# Patient Record
Sex: FEMALE | Race: BLACK OR AFRICAN AMERICAN | NOT HISPANIC OR LATINO | Employment: FULL TIME | URBAN - METROPOLITAN AREA
[De-identification: names, ages, dates, MRNs, and addresses within clinical notes are randomized per-mention and may not be internally consistent; named-entity substitution may affect disease eponyms.]

---

## 2017-07-27 ENCOUNTER — ALLSCRIPTS OFFICE VISIT (OUTPATIENT)
Dept: OTHER | Facility: OTHER | Age: 37
End: 2017-07-27

## 2022-05-27 ENCOUNTER — APPOINTMENT (EMERGENCY)
Dept: CT IMAGING | Facility: HOSPITAL | Age: 42
End: 2022-05-27
Payer: COMMERCIAL

## 2022-05-27 ENCOUNTER — HOSPITAL ENCOUNTER (EMERGENCY)
Facility: HOSPITAL | Age: 42
Discharge: HOME | End: 2022-05-27
Attending: EMERGENCY MEDICINE
Payer: COMMERCIAL

## 2022-05-27 VITALS
HEIGHT: 70 IN | TEMPERATURE: 97.6 F | BODY MASS INDEX: 27.06 KG/M2 | SYSTOLIC BLOOD PRESSURE: 128 MMHG | HEART RATE: 78 BPM | DIASTOLIC BLOOD PRESSURE: 72 MMHG | RESPIRATION RATE: 16 BRPM | WEIGHT: 189 LBS | OXYGEN SATURATION: 99 %

## 2022-05-27 DIAGNOSIS — U07.1 COVID: Primary | ICD-10-CM

## 2022-05-27 LAB
ALBUMIN SERPL BCP-MCNC: 3.6 G/DL (ref 3.5–5)
ALP SERPL-CCNC: 75 U/L (ref 46–116)
ALT SERPL W P-5'-P-CCNC: 20 U/L (ref 12–78)
ANION GAP SERPL CALCULATED.3IONS-SCNC: 7 MMOL/L (ref 4–13)
AST SERPL W P-5'-P-CCNC: 15 U/L (ref 5–45)
BASOPHILS # BLD MANUAL: 0 THOUSAND/UL (ref 0–0.1)
BASOPHILS NFR MAR MANUAL: 0 % (ref 0–1)
BILIRUB SERPL-MCNC: 0.2 MG/DL (ref 0.2–1)
BILIRUB UR QL STRIP: NEGATIVE
BUN SERPL-MCNC: 15 MG/DL (ref 5–25)
CALCIUM SERPL-MCNC: 8.6 MG/DL (ref 8.3–10.1)
CHLORIDE SERPL-SCNC: 104 MMOL/L (ref 100–108)
CLARITY UR: CLEAR
CO2 SERPL-SCNC: 27 MMOL/L (ref 21–32)
COLOR UR: YELLOW
CREAT SERPL-MCNC: 0.84 MG/DL (ref 0.6–1.3)
EOSINOPHIL # BLD MANUAL: 0.13 THOUSAND/UL (ref 0–0.4)
EOSINOPHIL NFR BLD MANUAL: 3 % (ref 0–6)
ERYTHROCYTE [DISTWIDTH] IN BLOOD BY AUTOMATED COUNT: 12 % (ref 11.6–15.1)
EXT PREG TEST URINE: NEGATIVE
EXT. CONTROL ED NAV: NORMAL
FLUAV RNA RESP QL NAA+PROBE: NEGATIVE
FLUBV RNA RESP QL NAA+PROBE: NEGATIVE
GFR SERPL CREATININE-BSD FRML MDRD: 86 ML/MIN/1.73SQ M
GLUCOSE SERPL-MCNC: 95 MG/DL (ref 65–140)
GLUCOSE UR STRIP-MCNC: NEGATIVE MG/DL
HCT VFR BLD AUTO: 38.8 % (ref 34.8–46.1)
HGB BLD-MCNC: 12.6 G/DL (ref 11.5–15.4)
HGB UR QL STRIP.AUTO: NEGATIVE
KETONES UR STRIP-MCNC: NEGATIVE MG/DL
LEUKOCYTE ESTERASE UR QL STRIP: NEGATIVE
LYMPHOCYTES # BLD AUTO: 2.55 THOUSAND/UL (ref 0.6–4.47)
LYMPHOCYTES # BLD AUTO: 58 % (ref 14–44)
MCH RBC QN AUTO: 31.1 PG (ref 26.8–34.3)
MCHC RBC AUTO-ENTMCNC: 32.5 G/DL (ref 31.4–37.4)
MCV RBC AUTO: 96 FL (ref 82–98)
MONOCYTES # BLD AUTO: 0.31 THOUSAND/UL (ref 0–1.22)
MONOCYTES NFR BLD: 7 % (ref 4–12)
NEUTROPHILS # BLD MANUAL: 1.23 THOUSAND/UL (ref 1.85–7.62)
NEUTS SEG NFR BLD AUTO: 28 % (ref 43–75)
NITRITE UR QL STRIP: NEGATIVE
PH UR STRIP.AUTO: 6 [PH]
PLATELET # BLD AUTO: 264 THOUSANDS/UL (ref 149–390)
PLATELET BLD QL SMEAR: ADEQUATE
PMV BLD AUTO: 11.1 FL (ref 8.9–12.7)
POTASSIUM SERPL-SCNC: 4 MMOL/L (ref 3.5–5.3)
PROT SERPL-MCNC: 7.4 G/DL (ref 6.4–8.2)
PROT UR STRIP-MCNC: NEGATIVE MG/DL
RBC # BLD AUTO: 4.05 MILLION/UL (ref 3.81–5.12)
RSV RNA RESP QL NAA+PROBE: NEGATIVE
SARS-COV-2 RNA RESP QL NAA+PROBE: POSITIVE
SODIUM SERPL-SCNC: 138 MMOL/L (ref 136–145)
SP GR UR STRIP.AUTO: 1.02 (ref 1–1.03)
UROBILINOGEN UR QL STRIP.AUTO: 0.2 E.U./DL
VARIANT LYMPHS # BLD AUTO: 4 %
WBC # BLD AUTO: 4.4 THOUSAND/UL (ref 4.31–10.16)

## 2022-05-27 PROCEDURE — 81025 URINE PREGNANCY TEST: CPT | Performed by: EMERGENCY MEDICINE

## 2022-05-27 PROCEDURE — 36415 COLL VENOUS BLD VENIPUNCTURE: CPT | Performed by: EMERGENCY MEDICINE

## 2022-05-27 PROCEDURE — 96361 HYDRATE IV INFUSION ADD-ON: CPT

## 2022-05-27 PROCEDURE — 85007 BL SMEAR W/DIFF WBC COUNT: CPT | Performed by: EMERGENCY MEDICINE

## 2022-05-27 PROCEDURE — 96374 THER/PROPH/DIAG INJ IV PUSH: CPT

## 2022-05-27 PROCEDURE — 99284 EMERGENCY DEPT VISIT MOD MDM: CPT | Performed by: EMERGENCY MEDICINE

## 2022-05-27 PROCEDURE — 99284 EMERGENCY DEPT VISIT MOD MDM: CPT

## 2022-05-27 PROCEDURE — 80053 COMPREHEN METABOLIC PANEL: CPT | Performed by: EMERGENCY MEDICINE

## 2022-05-27 PROCEDURE — 0241U HB NFCT DS VIR RESP RNA 4 TRGT: CPT | Performed by: EMERGENCY MEDICINE

## 2022-05-27 PROCEDURE — 81003 URINALYSIS AUTO W/O SCOPE: CPT | Performed by: EMERGENCY MEDICINE

## 2022-05-27 PROCEDURE — 74176 CT ABD & PELVIS W/O CONTRAST: CPT

## 2022-05-27 PROCEDURE — 85027 COMPLETE CBC AUTOMATED: CPT | Performed by: EMERGENCY MEDICINE

## 2022-05-27 RX ORDER — KETOROLAC TROMETHAMINE 30 MG/ML
15 INJECTION, SOLUTION INTRAMUSCULAR; INTRAVENOUS ONCE
Status: COMPLETED | OUTPATIENT
Start: 2022-05-27 | End: 2022-05-27

## 2022-05-27 RX ADMIN — KETOROLAC TROMETHAMINE 15 MG: 30 INJECTION, SOLUTION INTRAMUSCULAR at 14:18

## 2022-05-27 RX ADMIN — SODIUM CHLORIDE 1000 ML: 0.9 INJECTION, SOLUTION INTRAVENOUS at 14:09

## 2022-05-27 NOTE — Clinical Note
Lorenzo Floyd was seen and treated in our emergency department on 5/27/2022  Diagnosis: vane Graves  may return to work on return date  She may return on this date: 06/02/2022         If you have any questions or concerns, please don't hesitate to call        Ravi Gill MD    ______________________________           _______________          _______________  Hospital Representative                              Date                                Time

## 2022-05-27 NOTE — ED PROVIDER NOTES
History  Chief Complaint   Patient presents with    Flank Pain     Pt reports b/l flank and groin pain x 3 days  HPI  40 yo F presents with bilateral flank pain radiating to abdomen for the past three days  Also with congestion and bodyaches  Denies fevers or chills  Nothing makes better or worse  Pain is moderate, aching and constant  Denies trauma  Denies history of kidney stones  Reports medical history of migraines, surgical history of c section  None       History reviewed  No pertinent past medical history  History reviewed  No pertinent surgical history  History reviewed  No pertinent family history  I have reviewed and agree with the history as documented  E-Cigarette/Vaping    E-Cigarette Use Never User      E-Cigarette/Vaping Substances     Social History     Tobacco Use    Smoking status: Never Smoker    Smokeless tobacco: Never Used   Vaping Use    Vaping Use: Never used   Substance Use Topics    Alcohol use: Never    Drug use: Never       Review of Systems   Constitutional: Negative for chills and fever  HENT: Positive for congestion  Negative for dental problem and ear pain  Eyes: Negative for pain and redness  Respiratory: Negative for cough and shortness of breath  Cardiovascular: Negative for chest pain and palpitations  Gastrointestinal: Positive for abdominal pain  Negative for nausea  Endocrine: Negative for polydipsia and polyphagia  Genitourinary: Positive for flank pain  Negative for dysuria and frequency  Musculoskeletal: Positive for myalgias  Negative for arthralgias and joint swelling  Skin: Negative for color change and rash  Neurological: Negative for dizziness and headaches  Psychiatric/Behavioral: Negative for behavioral problems and confusion  All other systems reviewed and are negative  Physical Exam  Physical Exam  Vitals and nursing note reviewed  Constitutional:       General: She is not in acute distress       Appearance: She is well-developed  She is not diaphoretic  HENT:      Head: Atraumatic  Right Ear: External ear normal       Left Ear: External ear normal       Nose: Nose normal    Eyes:      Conjunctiva/sclera: Conjunctivae normal       Pupils: Pupils are equal, round, and reactive to light  Neck:      Vascular: No JVD  Cardiovascular:      Rate and Rhythm: Normal rate and regular rhythm  Heart sounds: Normal heart sounds  No murmur heard  Pulmonary:      Effort: Pulmonary effort is normal  No respiratory distress  Breath sounds: Normal breath sounds  No wheezing  Abdominal:      General: Bowel sounds are normal  There is no distension  Palpations: Abdomen is soft  Tenderness: There is abdominal tenderness  Musculoskeletal:         General: Normal range of motion  Cervical back: Normal range of motion and neck supple  Comments: +b/l CVA tenderness   Skin:     General: Skin is warm and dry  Capillary Refill: Capillary refill takes less than 2 seconds  Neurological:      Mental Status: She is alert and oriented to person, place, and time  Cranial Nerves: No cranial nerve deficit     Psychiatric:         Behavior: Behavior normal          Vital Signs  ED Triage Vitals   Temperature Pulse Respirations Blood Pressure SpO2   05/27/22 1056 05/27/22 1056 05/27/22 1056 05/27/22 1056 05/27/22 1056   97 6 °F (36 4 °C) 75 18 132/79 99 %      Temp Source Heart Rate Source Patient Position - Orthostatic VS BP Location FiO2 (%)   05/27/22 1056 05/27/22 1056 05/27/22 1056 05/27/22 1056 --   Tympanic Monitor Sitting Left arm       Pain Score       05/27/22 1418       8           Vitals:    05/27/22 1056 05/27/22 1633   BP: 132/79 128/72   Pulse: 75 78   Patient Position - Orthostatic VS: Sitting Lying         Visual Acuity      ED Medications  Medications   ketorolac (TORADOL) injection 15 mg (15 mg Intravenous Given 5/27/22 1418)   sodium chloride 0 9 % bolus 1,000 mL (0 mL Intravenous Stopped 5/27/22 1635)       Diagnostic Studies  Results Reviewed     Procedure Component Value Units Date/Time    POCT pregnancy, urine [[id number]]  (Normal) Resulted: 05/27/22 1516    Lab Status: Final result Updated: 05/27/22 1516     EXT PREG TEST UR (Ref: Negative) negative     Control valid    COVID/FLU/RSV [[id number]]  (Abnormal) Collected: 05/27/22 1409    Lab Status: Final result Specimen: Nares from Nose Updated: 05/27/22 1505     SARS-CoV-2 Positive     INFLUENZA A PCR Negative     INFLUENZA B PCR Negative     RSV PCR Negative    Narrative:      FOR PEDIATRIC PATIENTS - copy/paste COVID Guidelines URL to browser: [email or web address]  [employer]    SARS-CoV-2 assay is a Nucleic Acid Amplification assay intended for the  qualitative detection of nucleic acid from SARS-CoV-2 in nasopharyngeal  swabs  Results are for the presumptive identification of SARS-CoV-2 RNA  Positive results are indicative of infection with SARS-CoV-2, the virus  causing COVID-19, but do not rule out bacterial infection or co-infection  with other viruses  Laboratories within the [place] and its  territories are required to report all positive results to the appropriate  public health authorities  Negative results do not preclude SARS-CoV-2  infection and should not be used as the sole basis for treatment or other  patient management decisions  Negative results must be combined with  clinical observations, patient history, and epidemiological information  This test has not been FDA cleared or approved  This test has been authorized by FDA under an Emergency Use Authorization  (EUA)  This test is only authorized for the duration of time the  declaration that circumstances exist justifying the authorization of the  emergency use of an in vitro diagnostic tests for detection of SARS-CoV-2  virus and/or diagnosis of COVID-19 infection under section 564(b)(1) of  the Act, 21 U  S C  360bbb-3(b)(1), unless the authorization is terminated  or revoked sooner  The test has been validated but independent review by FDA  and CLIA is pending  Test performed using [employer] Gene[employer]pert: This RT-PCR assay targets N2,  a region unique to SARS-CoV-2  A conserved region in the E-gene was chosen  for pan-Sarbecovirus detection which includes SARS-CoV-2  CBC and differential [[id number]]  (Normal) Collected: 05/27/22 1409    Lab Status: Final result Specimen: Blood from Arm, Right Updated: 05/27/22 1454     WBC 4 40 Thousand/uL      RBC 4 05 Million/uL      Hemoglobin 12 6 g/dL      Hematocrit 38 8 %      MCV 96 fL      MCH 31 1 pg      MCHC 32 5 g/dL      RDW 12 0 %      MPV 11 1 fL      Platelets 264 Thousands/uL     Narrative: This is an appended report  These results have been appended to a previously verified report      Manual Differential(PHLEBS Do Not Order) [[id number]]  (Abnormal) Collected: 05/27/22 1409    Lab Status: Final result Specimen: Blood from Arm, Right Updated: 05/27/22 1454     Segmented % 28 %      Lymphocytes % 58 %      Monocytes % 7 %      Eosinophils, % 3 %      Basophils % 0 %      Atypical Lymphocytes % 4 %      Absolute Neutrophils 1 23 Thousand/uL      Lymphocytes Absolute 2 55 Thousand/uL      Monocytes Absolute 0 31 Thousand/uL      Eosinophils Absolute 0 13 Thousand/uL      Basophils Absolute 0 00 Thousand/uL      Total Counted --     Platelet Estimate Adequate    Comprehensive metabolic panel [[id number]] Collected: 05/27/22 1409    Lab Status: Final result Specimen: Blood from Arm, Right Updated: 05/27/22 1439     Sodium 138 mmol/L      Potassium 4 0 mmol/L      Chloride 104 mmol/L      CO2 27 mmol/L      ANION GAP 7 mmol/L      BUN 15 mg/dL      Creatinine 0 84 mg/dL      Glucose 95 mg/dL      Calcium 8 6 mg/dL      AST 15 U/L      ALT 20 U/L      Alkaline Phosphatase 75 U/L      Total Protein 7 4 g/dL      Albumin 3 6 g/dL      Total Bilirubin 0 20 mg/dL      eGFR 86 ml/min/1 73sq m     Narrative:      National Kidney Disease Foundation guidelines for Chronic Kidney Disease (CKD):     Stage 1 with normal or high GFR (GFR > 90 mL/min/1 73 square meters)    Stage 2 Mild CKD (GFR = 60-89 mL/min/1 73 square meters)    Stage 3A Moderate CKD (GFR = 45-59 mL/min/1 73 square meters)    Stage 3B Moderate CKD (GFR = 30-44 mL/min/1 73 square meters)    Stage 4 Severe CKD (GFR = 15-29 mL/min/1 73 square meters)    Stage 5 End Stage CKD (GFR <15 mL/min/1 73 square meters)  Note: GFR calculation is accurate only with a steady state creatinine    UA w Reflex to Microscopic w Reflex to Culture [[id number]] Collected: 05/27/22 1419    Lab Status: Final result Specimen: Urine, Other Updated: 05/27/22 1426     Color, UA Yellow     Clarity, UA Clear     Specific Gravity, UA 1 025     pH, UA 6 0     Leukocytes, UA Negative     Nitrite, UA Negative     Protein, UA Negative mg/dl      Glucose, UA Negative mg/dl      Ketones, UA Negative mg/dl      Urobilinogen, UA 0 2 E U /dl      Bilirubin, UA Negative     Blood, UA Negative                 CT abdomen pelvis wo contrast   Final Result by Zoila Jack MD (05/27 1605)      No acute findings in the abdomen or pelvis  Workstation performed: [id number]                    Procedures  Procedures         ED Course                               SBIRT 22yo+    Flowsheet Row Most Recent Value   SBIRT (25 yo +)    In order to provide better care to our patients, we are screening all of our patients for alcohol and drug use  Would it be okay to ask you these screening questions? Yes Filed at: 05/27/2022 1310   Initial Alcohol Screen: US AUDIT-C     1  How often do you have a drink containing alcohol? 0 Filed at: 05/27/2022 1310   2  How many drinks containing alcohol do you have on a typical day you are drinking? 0 Filed at: 05/27/2022 1310   3a  Male UNDER 65: How often do you have five or more drinks on one occasion?  0 Filed at: 05/27/2022 1310   3b  FEMALE Any Age, or MALE 65+: How often do you have 4 or more drinks on one occassion? 0 Filed at: 05/27/2022 1310   Audit-C Score 0 Filed at: 05/27/2022 1310   JORGE: How many times in the past year have you    Used an illegal drug or used a prescription medication for non-medical reasons? Never Filed at: 05/27/2022 1310                    MDM  Patient presents with congestion, body aches, flank/abdominal pain  COVID positive  CT shows no acute abnormality  Labs unremarkable and reassuring  Patient states symptoms started one week ago  Discussed supportive care and PCP follow up  Disposition  Final diagnoses:   COVID     Time reflects when diagnosis was documented in both MDM as applicable and the Disposition within this note     Time User Action Codes Description Comment    5/27/2022  4:13 PM Sukhjinder Cortez Add [U07 1] [facility]       ED Disposition     ED Disposition   Discharge    Condition   Stable    Date/Time   Fri May 27, 2022  4:13 PM    Jennifer Brenner discharge to home/self care  Follow-up Information     Follow up With Specialties Details Why Alia Martínez MD Internal Medicine Call  for primary care doctor [address] [place] [address]  [phone]            There are no discharge medications for this patient  No discharge procedures on file      PDMP Review     None          ED Provider  Electronically Signed by           Junito Burrows MD  05/27/22 0561

## 2023-09-19 ENCOUNTER — APPOINTMENT (EMERGENCY)
Dept: CT IMAGING | Facility: HOSPITAL | Age: 43
End: 2023-09-19
Payer: COMMERCIAL

## 2023-09-19 ENCOUNTER — HOSPITAL ENCOUNTER (EMERGENCY)
Facility: HOSPITAL | Age: 43
Discharge: HOME | End: 2023-09-19
Attending: EMERGENCY MEDICINE
Payer: COMMERCIAL

## 2023-09-19 VITALS
OXYGEN SATURATION: 100 % | DIASTOLIC BLOOD PRESSURE: 70 MMHG | TEMPERATURE: 98.5 F | RESPIRATION RATE: 22 BRPM | SYSTOLIC BLOOD PRESSURE: 103 MMHG | HEART RATE: 58 BPM

## 2023-09-19 DIAGNOSIS — R10.9 ABDOMINAL PAIN: Primary | ICD-10-CM

## 2023-09-19 LAB
ALBUMIN SERPL BCP-MCNC: 4.3 G/DL (ref 3.5–5)
ALP SERPL-CCNC: 67 U/L (ref 34–104)
ALT SERPL W P-5'-P-CCNC: 10 U/L (ref 7–52)
ANION GAP SERPL CALCULATED.3IONS-SCNC: 6 MMOL/L
AST SERPL W P-5'-P-CCNC: 14 U/L (ref 13–39)
BASOPHILS # BLD AUTO: 0.06 THOUSANDS/ÂΜL (ref 0–0.1)
BASOPHILS NFR BLD AUTO: 1 % (ref 0–1)
BILIRUB SERPL-MCNC: 0.26 MG/DL (ref 0.2–1)
BUN SERPL-MCNC: 13 MG/DL (ref 5–25)
CALCIUM SERPL-MCNC: 9.5 MG/DL (ref 8.4–10.2)
CHLORIDE SERPL-SCNC: 108 MMOL/L (ref 96–108)
CO2 SERPL-SCNC: 27 MMOL/L (ref 21–32)
CREAT SERPL-MCNC: 0.92 MG/DL (ref 0.6–1.3)
EOSINOPHIL # BLD AUTO: 0.18 THOUSAND/ÂΜL (ref 0–0.61)
EOSINOPHIL NFR BLD AUTO: 3 % (ref 0–6)
ERYTHROCYTE [DISTWIDTH] IN BLOOD BY AUTOMATED COUNT: 12.3 % (ref 11.6–15.1)
GFR SERPL CREATININE-BSD FRML MDRD: 76 ML/MIN/1.73SQ M
GLUCOSE SERPL-MCNC: 88 MG/DL (ref 65–140)
HCG SERPL QL: NEGATIVE
HCT VFR BLD AUTO: 36.8 % (ref 34.8–46.1)
HGB BLD-MCNC: 12.1 G/DL (ref 11.5–15.4)
IMM GRANULOCYTES # BLD AUTO: 0.01 THOUSAND/UL (ref 0–0.2)
IMM GRANULOCYTES NFR BLD AUTO: 0 % (ref 0–2)
LIPASE SERPL-CCNC: 58 U/L (ref 11–82)
LYMPHOCYTES # BLD AUTO: 2.32 THOUSANDS/ÂΜL (ref 0.6–4.47)
LYMPHOCYTES NFR BLD AUTO: 43 % (ref 14–44)
MCH RBC QN AUTO: 31.4 PG (ref 26.8–34.3)
MCHC RBC AUTO-ENTMCNC: 32.9 G/DL (ref 31.4–37.4)
MCV RBC AUTO: 96 FL (ref 82–98)
MONOCYTES # BLD AUTO: 0.27 THOUSAND/ÂΜL (ref 0.17–1.22)
MONOCYTES NFR BLD AUTO: 5 % (ref 4–12)
NEUTROPHILS # BLD AUTO: 2.53 THOUSANDS/ÂΜL (ref 1.85–7.62)
NEUTS SEG NFR BLD AUTO: 48 % (ref 43–75)
NRBC BLD AUTO-RTO: 0 /100 WBCS
PLATELET # BLD AUTO: 267 THOUSANDS/UL (ref 149–390)
PMV BLD AUTO: 11 FL (ref 8.9–12.7)
POTASSIUM SERPL-SCNC: 3.6 MMOL/L (ref 3.5–5.3)
PROT SERPL-MCNC: 7.6 G/DL (ref 6.4–8.4)
RBC # BLD AUTO: 3.85 MILLION/UL (ref 3.81–5.12)
SODIUM SERPL-SCNC: 141 MMOL/L (ref 135–147)
WBC # BLD AUTO: 5.37 THOUSAND/UL (ref 4.31–10.16)

## 2023-09-19 PROCEDURE — 80053 COMPREHEN METABOLIC PANEL: CPT | Performed by: EMERGENCY MEDICINE

## 2023-09-19 PROCEDURE — 74177 CT ABD & PELVIS W/CONTRAST: CPT

## 2023-09-19 PROCEDURE — 85025 COMPLETE CBC W/AUTO DIFF WBC: CPT | Performed by: EMERGENCY MEDICINE

## 2023-09-19 PROCEDURE — 96376 TX/PRO/DX INJ SAME DRUG ADON: CPT

## 2023-09-19 PROCEDURE — 83690 ASSAY OF LIPASE: CPT | Performed by: EMERGENCY MEDICINE

## 2023-09-19 PROCEDURE — 84703 CHORIONIC GONADOTROPIN ASSAY: CPT | Performed by: EMERGENCY MEDICINE

## 2023-09-19 PROCEDURE — 96374 THER/PROPH/DIAG INJ IV PUSH: CPT

## 2023-09-19 PROCEDURE — 99284 EMERGENCY DEPT VISIT MOD MDM: CPT

## 2023-09-19 PROCEDURE — 99285 EMERGENCY DEPT VISIT HI MDM: CPT | Performed by: EMERGENCY MEDICINE

## 2023-09-19 PROCEDURE — 36415 COLL VENOUS BLD VENIPUNCTURE: CPT | Performed by: EMERGENCY MEDICINE

## 2023-09-19 RX ORDER — MAGNESIUM HYDROXIDE/ALUMINUM HYDROXICE/SIMETHICONE 120; 1200; 1200 MG/30ML; MG/30ML; MG/30ML
30 SUSPENSION ORAL ONCE
Status: COMPLETED | OUTPATIENT
Start: 2023-09-19 | End: 2023-09-19

## 2023-09-19 RX ORDER — SUCRALFATE 1 G/1
1 TABLET ORAL ONCE
Status: COMPLETED | OUTPATIENT
Start: 2023-09-19 | End: 2023-09-19

## 2023-09-19 RX ORDER — KETOROLAC TROMETHAMINE 30 MG/ML
15 INJECTION, SOLUTION INTRAMUSCULAR; INTRAVENOUS ONCE
Status: DISCONTINUED | OUTPATIENT
Start: 2023-09-19 | End: 2023-09-19

## 2023-09-19 RX ORDER — DICYCLOMINE HCL 20 MG
20 TABLET ORAL 2 TIMES DAILY PRN
Qty: 20 TABLET | Refills: 0 | Status: SHIPPED | OUTPATIENT
Start: 2023-09-19

## 2023-09-19 RX ORDER — FENTANYL CITRATE 50 UG/ML
50 INJECTION, SOLUTION INTRAMUSCULAR; INTRAVENOUS ONCE AS NEEDED
Status: COMPLETED | OUTPATIENT
Start: 2023-09-19 | End: 2023-09-19

## 2023-09-19 RX ORDER — DICYCLOMINE HCL 20 MG
20 TABLET ORAL ONCE
Status: COMPLETED | OUTPATIENT
Start: 2023-09-19 | End: 2023-09-19

## 2023-09-19 RX ADMIN — ALUMINUM HYDROXIDE, MAGNESIUM HYDROXIDE, AND DIMETHICONE 30 ML: 200; 20; 200 SUSPENSION ORAL at 04:43

## 2023-09-19 RX ADMIN — DICYCLOMINE HYDROCHLORIDE 20 MG: 20 TABLET ORAL at 06:40

## 2023-09-19 RX ADMIN — FENTANYL CITRATE 50 MCG: 50 INJECTION INTRAMUSCULAR; INTRAVENOUS at 05:00

## 2023-09-19 RX ADMIN — FENTANYL CITRATE 50 MCG: 50 INJECTION INTRAMUSCULAR; INTRAVENOUS at 06:19

## 2023-09-19 RX ADMIN — IOHEXOL 100 ML: 350 INJECTION, SOLUTION INTRAVENOUS at 05:19

## 2023-09-19 RX ADMIN — SUCRALFATE 1 G: 1 TABLET ORAL at 04:43

## 2023-09-19 NOTE — DISCHARGE INSTRUCTIONS
IMPRESSION:     Scattered colonic diverticulosis. Moderate colonic wall thickening is seen from the ascending through the sigmoid colon, suspicious for a colitis. No discrete evidence of bowel obstruction. Correlation with the patient's symptoms and laboratory values   recommended     Cholelithiasis without discrete CT evidence of acute cholecystitis.

## 2023-09-19 NOTE — ED NOTES
Patient resting comfortably at this time. Denies pain. Will hold pain medication.       Richmond Geiger RN  09/19/23 [id number]

## 2023-09-19 NOTE — ED PROVIDER NOTES
History  Chief Complaint   Patient presents with   • Abdominal Pain     X 2 hours, hx of h pylori.      37 y.o. female presents with a chief complaint of "pain, in my stomach" which patient clarifies she states is "on and off from the bacteria" stating she has been recently treated for H. pylori by gastroenterology. Patient affirms 2 hours of upper abdominal pain without radiation. Patient describes severe pain that came on suddenly and continues in the emergency room. Patient denies vomiting. Patient affirms diarrhea. Patient notes last bowel movement was just prior to evaluation. Patient denies urinary symptoms. Patient denies vaginal bleeding or discharge. Patient denies contacts with similar symptoms. Patient denies any recent use of antibiotics, international travel, or trauma. Patient notes history of . Focused Objective Exam:  Abdomen:  Inspection of an abdomen without previous abdominal surgical incisions noted without erythema, rashes or ecchymosis noted. No abdominal pulsations noted. Normal bowel sounds with no bruit auscultated. Soft abdomen. Palpitation noted tenderness most notably in the upper fields though there is tenderness in the lower fields. No masses or pulsatile aorta noted on examination. No rebound or guarding noted on examination, non-peritoneal exam.    Back:  No rash or signs of herpes zoster. No CVA tenderness noted. Skin:  No ecchymosis of the umbilicus (negative Murali's sign) or flank (negative Moya Calabrese's sign). Warm and dry. Medical Decision Making    Abdominal pain with a broad differential.  Will establish IV access and make patient NPO considering possibility of surgical intervention required. Will initiate symptomatic management including intravenous fluids. Will obtain qualitative pregnancy testing to identify possible alternative diagnoses and etiologies of patient's pain.     Patient has a history of gastritis and which increases the evaluation of the complexity of their presenting complaint. Review of the medical record including prior gastroenterology note. Differential is broad and includes significant likelihood of intra-abdominal pathology, including concerns for potential gastric pathology versus perforation from known ulcers. Patient does not have peritoneal signs on initial evaluation but she does note significant tenderness and pain. Will obtain CBC to evaluate for anemia and leukocytosis. Will obtain CMP to evaluate electrolytes, renal, biliary, and hepatic function. Will obtain lipase to evaluate for potential pancreatitis. Based on patient's age, history, physical exam and presenting complaint, will obtain contrast enhanced CT imaging of patient's abdomen and pelvis to further evaluate for possible intraabdominal pathology. Prior to Admission Medications   Prescriptions Last Dose Informant Patient Reported? Taking?    Docusate Sodium (DSS) 100 MG CAPS   Yes No   Sig: Take 100 mg by mouth 2 (two) times a day   Magnesium 500 MG CAPS   Yes No   Sig: Take 500 mg by mouth daily   Riboflavin 400 MG CAPS   Yes No   Sig: Take 400 mg by mouth daily   cetirizine (ZyrTEC) 10 MG chewable tablet   Yes No   Sig: Chew 10 mg daily   diphenhydrAMINE (BENADRYL) 25 mg capsule   Yes No   Sig: Take 25 mg by mouth   ibuprofen (Advil) 200 mg tablet   Yes No   Sig: Take by mouth   magnesium oxide (MAG-OX) 400 mg tablet   Yes No   Sig: Take 1 tablet by mouth daily   metoclopramide (REGLAN) 10 mg tablet   Yes No   Sig: Take 10 mg by mouth   naproxen sodium (ANAPROX) 550 mg tablet   Yes No   Sig: Take 550 mg by mouth   norgestimate-ethinyl estradiol (ORTHO-CYCLEN) 0.25-35 MG-MCG per tablet   Yes No   Sig: Take 1 tablet by mouth daily   omeprazole (PriLOSEC) 20 mg delayed release capsule   Yes No   Sig: Take 20 mg by mouth daily   polyethylene glycol (GLYCOLAX) 17 GM/SCOOP powder   Yes No   Sig: Take 17 g by mouth daily   simethicone (MYLICON) 80 mg chewable tablet   Yes No   Sig: Chew 80 mg      Facility-Administered Medications: None       History reviewed. No pertinent past medical history. History reviewed. No pertinent surgical history. History reviewed. No pertinent family history. I have reviewed and agree with the history as documented.     E-Cigarette/Vaping   • E-Cigarette Use Never User      E-Cigarette/Vaping Substances     Social History     Tobacco Use   • Smoking status: Never   • Smokeless tobacco: Never   Vaping Use   • Vaping Use: Never used   Substance Use Topics   • Alcohol use: Never   • Drug use: Never       Review of Systems    Physical Exam  Physical Exam    Vital Signs  ED Triage Vitals   Temperature Pulse Respirations Blood Pressure SpO2   09/19/23 0400 09/19/23 0400 09/19/23 0400 09/19/23 0400 09/19/23 0400   98.5 °F (36.9 °C) 72 22 107/79 99 %      Temp Source Heart Rate Source Patient Position - Orthostatic VS BP Location FiO2 (%)   09/19/23 0400 09/19/23 0400 09/19/23 0400 09/19/23 0400 --   Oral Monitor Lying Right arm       Pain Score       09/19/23 0500       10 - Worst Possible Pain           Vitals:    09/19/23 0530 09/19/23 0600 09/19/23 0619 09/19/23 0630   BP: 103/70      Pulse: 63 57 61 58   Patient Position - Orthostatic VS: Lying            Visual Acuity  Visual Acuity    Flowsheet Row Most Recent Value   L Pupil Size (mm) 3   R Pupil Size (mm) 3          ED Medications  Medications   sucralfate (CARAFATE) tablet 1 g (1 g Oral Given 9/19/23 0443)   aluminum-magnesium hydroxide-simethicone (MAALOX) oral suspension 30 mL (30 mL Oral Given 9/19/23 0443)   fentanyl citrate (PF) 100 MCG/2ML 50 mcg (50 mcg Intravenous Given 9/19/23 0500)   iohexol (OMNIPAQUE) 350 MG/ML injection (MULTI-DOSE) 100 mL (100 mL Intravenous Given 9/19/23 0519)   fentanyl citrate (PF) 100 MCG/2ML 50 mcg (50 mcg Intravenous Given 9/19/23 0619)   dicyclomine (BENTYL) tablet 20 mg (20 mg Oral Given 9/19/23 0640) Diagnostic Studies  Results Reviewed     Procedure Component Value Units Date/Time    hCG, qualitative pregnancy [[id number]]  (Normal) Collected: 09/19/23 0440    Lab Status: Final result Specimen: Blood from Arm, Right Updated: 09/19/23 0509     Preg, Serum Negative    CMP [[id number]] Collected: 09/19/23 0440    Lab Status: Final result Specimen: Blood from Arm, Right Updated: 09/19/23 0504     Sodium 141 mmol/L      Potassium 3.6 mmol/L      Chloride 108 mmol/L      CO2 27 mmol/L      ANION GAP 6 mmol/L      BUN 13 mg/dL      Creatinine 0.92 mg/dL      Glucose 88 mg/dL      Calcium 9.5 mg/dL      AST 14 U/L      ALT 10 U/L      Alkaline Phosphatase 67 U/L      Total Protein 7.6 g/dL      Albumin 4.3 g/dL      Total Bilirubin 0.26 mg/dL      eGFR 76 ml/min/1.73sq m     Narrative:      [facility] guidelines for Chronic Kidney Disease (CKD):   •  Stage 1 with normal or high GFR (GFR > 90 mL/min/1.73 square meters)  •  Stage 2 Mild CKD (GFR = 60-89 mL/min/1.73 square meters)  •  Stage 3A Moderate CKD (GFR = 45-59 mL/min/1.73 square meters)  •  Stage 3B Moderate CKD (GFR = 30-44 mL/min/1.73 square meters)  •  Stage 4 Severe CKD (GFR = 15-29 mL/min/1.73 square meters)  •  Stage 5 End Stage CKD (GFR <15 mL/min/1.73 square meters)  Note: GFR calculation is accurate only with a steady state creatinine    Lipase [[id number]]  (Normal) Collected: 09/19/23 0440    Lab Status: Final result Specimen: Blood from Arm, Right Updated: 09/19/23 0504     Lipase 58 u/L     CBC and differential [[id number]] Collected: 09/19/23 0440    Lab Status: Final result Specimen: Blood from Arm, Right Updated: 09/19/23 0443     WBC 5.37 Thousand/uL      RBC 3.85 Million/uL      Hemoglobin 12.1 g/dL      Hematocrit 36.8 %      MCV 96 fL      MCH 31.4 pg      MCHC 32.9 g/dL      RDW 12.3 %      MPV 11.0 fL      Platelets 192 Thousands/uL      nRBC 0 /100 WBCs      Neutrophils Relative 48 %      Immat GRANS % 0 % Lymphocytes Relative 43 %      Monocytes Relative 5 %      Eosinophils Relative 3 %      Basophils Relative 1 %      Neutrophils Absolute 2.53 Thousands/µL      Immature Grans Absolute 0.01 Thousand/uL      Lymphocytes Absolute 2.32 Thousands/µL      Monocytes Absolute 0.27 Thousand/µL      Eosinophils Absolute 0.18 Thousand/µL      Basophils Absolute 0.06 Thousands/µL                  CT abdomen pelvis with contrast   Final Result by David Richardson DO (09/19 2239)      Scattered colonic diverticulosis. Moderate colonic wall thickening is seen from the ascending through the sigmoid colon, suspicious for a colitis. No discrete evidence of bowel obstruction. Correlation with the patient's symptoms and laboratory values    recommended      Cholelithiasis without discrete CT evidence of acute cholecystitis. Other findings as above. Workstation performed: [id number]                    Procedures  Procedures         ED Course  ED Course as of 09/19/23 2144   Tue Sep 19, 2023   2908 Hepatic and renal function hepatic and renal function within patient CT demonstrates finding consistent with colitis as a source of her symptoms. Patient was out of the country in [facility] in July and recently on a course of antibiotics for her H. pylori. Seems less likely enteric or C. difficile, particularly considering normal leukocytosis however will provide her with prescription to obtain testing. Patient has not had any bowel movements in the emergency room. Discussed diagnostic concern and the need for continued follow-up with gastroenterology. Patient notes improvement with persistent symptoms. Emphasized return to the emergency room any progression or worsening symptoms the patient affirmed understanding. SBIRT 20yo+    Flowsheet Row Most Recent Value   Initial Alcohol Screen: US AUDIT-C     1. How often do you have a drink containing alcohol? 0 Filed at: 09/19/2023 0416   2. How many drinks containing alcohol do you have on a typical day you are drinking? 0 Filed at: 09/19/2023 0416   3b. FEMALE Any Age, or MALE 65+: How often do you have 4 or more drinks on one occassion? 0 Filed at: 09/19/2023 0416   Audit-C Score 0 Filed at: 09/19/2023 0416                    MDM    Disposition  Final diagnoses:   Abdominal pain     Time reflects when diagnosis was documented in both MDM as applicable and the Disposition within this note     Time User Action Codes Description Comment    9/19/2023  5:53 AM Kaelyn Kaur Add [R10.9] Abdominal pain       ED Disposition     ED Disposition   Discharge    Condition   Stable    Date/Time   Tue Sep 19, 2023  6:27 AM    Comment   Tedi Boxer discharge to home/self care. Follow-up Information     Follow up With Specialties Details Why Contact Info Additional [facility] Schedule an appointment as soon as possible for a visit in 2 days Call today to schedule follow-up this week for reevaluation with gastroenterology.  [address] [facility] [address] [id number]  [facility] Emergency Department Emergency Medicine Go to  If symptoms worsen [address] [facility] [address] [facility] Emergency Department, [place], [place], [address]          Discharge Medication List as of 9/19/2023  6:27 AM      START taking these medications    Details   dicyclomine (BENTYL) 20 mg tablet Take 1 tablet (20 mg total) by mouth 2 (two) times a day as needed (Abdominal pain), Starting Tue 9/19/2023, Print         CONTINUE these medications which have NOT CHANGED    Details   cetirizine (ZyrTEC) 10 MG chewable tablet Chew 10 mg daily, Historical Med      diphenhydrAMINE (BENADRYL) 25 mg capsule Take 25 mg by mouth, Starting Thu 5/26/2022, Historical Med      Docusate Sodium (DSS) 100 MG CAPS Take 100 mg by mouth 2 (two) times a day, Starting Fri 6/9/2023, Historical Med      ibuprofen (Advil) 200 mg tablet Take by mouth, Historical Med      Magnesium 500 MG CAPS Take 500 mg by mouth daily, Starting Thu 5/26/2022, Historical Med      magnesium oxide (MAG-OX) 400 mg tablet Take 1 tablet by mouth daily, Starting Mon 6/5/2023, Historical Med      metoclopramide (REGLAN) 10 mg tablet Take 10 mg by mouth, Starting Thu 5/26/2022, Historical Med      naproxen sodium (ANAPROX) 550 mg tablet Take 550 mg by mouth, Starting Thu 5/26/2022, Historical Med      norgestimate-ethinyl estradiol (ORTHO-CYCLEN) 0.25-35 MG-MCG per tablet Take 1 tablet by mouth daily, Starting Thu 5/26/2022, Historical Med      omeprazole (PriLOSEC) 20 mg delayed release capsule Take 20 mg by mouth daily, Starting Wed 3/1/2023, Historical Med      polyethylene glycol (GLYCOLAX) 17 GM/SCOOP powder Take 17 g by mouth daily, Starting Fri 6/9/2023, Historical Med      Riboflavin 400 MG CAPS Take 400 mg by mouth daily, Starting Thu 5/26/2022, Historical Med      simethicone (MYLICON) 80 mg chewable tablet Chew 80 mg, Starting Mon 6/5/2023, Historical Med             Outpatient Discharge Orders   Clostridium difficile toxin by PCR with EIA   Standing Status: Future Standing Exp. Date: 09/19/24     Stool Enteric Bacterial Panel by PCR   Standing Status: Future Standing Exp.  Date: 09/19/24       PDMP Review     None          ED Provider  Electronically Signed by           Wu Cole MD  09/19/23 0608

## 2023-11-10 ENCOUNTER — OFFICE VISIT (OUTPATIENT)
Age: 43
End: 2023-11-10
Payer: COMMERCIAL

## 2023-11-10 VITALS
HEIGHT: 70 IN | SYSTOLIC BLOOD PRESSURE: 118 MMHG | HEART RATE: 66 BPM | BODY MASS INDEX: 26.48 KG/M2 | WEIGHT: 185 LBS | OXYGEN SATURATION: 98 % | DIASTOLIC BLOOD PRESSURE: 70 MMHG

## 2023-11-10 DIAGNOSIS — K21.9 GASTROESOPHAGEAL REFLUX DISEASE WITHOUT ESOPHAGITIS: ICD-10-CM

## 2023-11-10 DIAGNOSIS — K80.20 GALLSTONES: ICD-10-CM

## 2023-11-10 DIAGNOSIS — K58.1 IRRITABLE BOWEL SYNDROME WITH CONSTIPATION: Primary | ICD-10-CM

## 2023-11-10 PROCEDURE — 99204 OFFICE O/P NEW MOD 45 MIN: CPT | Performed by: PHYSICIAN ASSISTANT

## 2023-11-10 RX ORDER — LINACLOTIDE 290 UG/1
290 CAPSULE, GELATIN COATED ORAL DAILY
Qty: 90 CAPSULE | Refills: 0 | Status: SHIPPED | OUTPATIENT
Start: 2023-11-10 | End: 2024-02-08

## 2023-11-10 RX ORDER — FAMOTIDINE 20 MG/1
TABLET, FILM COATED ORAL
COMMUNITY

## 2023-11-10 RX ORDER — ACYCLOVIR 50 MG/G
OINTMENT TOPICAL
COMMUNITY
Start: 2023-08-18

## 2023-11-10 RX ORDER — PANTOPRAZOLE SODIUM 40 MG/1
40 TABLET, DELAYED RELEASE ORAL 2 TIMES DAILY WITH MEALS
COMMUNITY

## 2023-11-10 RX ORDER — VALACYCLOVIR HYDROCHLORIDE 1 G/1
1000 TABLET, FILM COATED ORAL DAILY
COMMUNITY
Start: 2023-08-18

## 2023-11-10 RX ORDER — DICYCLOMINE HCL 20 MG
20 TABLET ORAL EVERY 8 HOURS PRN
Qty: 90 TABLET | Refills: 1 | Status: SHIPPED | OUTPATIENT
Start: 2023-11-10 | End: 2024-01-09

## 2023-11-10 RX ORDER — LINACLOTIDE 145 UG/1
145 CAPSULE, GELATIN COATED ORAL
COMMUNITY

## 2023-11-10 RX ORDER — CLOTRIMAZOLE AND BETAMETHASONE DIPROPIONATE 10; .64 MG/G; MG/G
CREAM TOPICAL
COMMUNITY
Start: 2023-08-19

## 2023-11-10 RX ORDER — SUCRALFATE 1 G/1
1 TABLET ORAL 4 TIMES DAILY
COMMUNITY
Start: 2023-10-25

## 2023-11-10 NOTE — PROGRESS NOTES
[facility] - Outpatient Consultation  Ricardo Mix 37 y.o. female MRN: [id number]  Encounter: [id number]          ASSESSMENT AND PLAN:      1. Irritable bowel syndrome with constipation  2. Gastroesophageal reflux disease without esophagitis  3. Gallstones    Patient reports struggling with abdominal pain, bloating, constipation, and reflux over the past year. She had an EGD 7/27 which was normal visually. Biopsies were positive for h pylori and patient was treatment with Amoxicillin, Clarithromycin, and PPI. Follow up stool h pylori off the PPI was negative. She had a colonoscopy 7/27  which was normal and the terminal ileum was normal.  She had a CT Scan with contrast and an ultrasound which showed gallstones. She is on Linzess 145mcg po daily but still having constipation, bloating and abdominal discomfort. She is also on Pantoprazole but still has episodes of RUQ pain after eating. Plan to optimize her treatment for IBS. We will increase Linzess to 290mcg po daily at least an hour before breakfast.  Low FODMAP diet trial (information sheet provided to patient). Paulina veen intestinal spasms. Will also refer to generally surgery regarding a consultation for her gallstones. We reviewed that all of her symptoms are definitely not from the gallstones (she has IBS and GERD) but she does also have episodes of postprandial RUQ abdominal pain which may be from symptomatic gallstones. Will also check celiac serology and a TSH level. She was instructed to call/follow up if not improving on the higher dose of the Linzess. ______________________________________________________________________    HPI:  Patient is a pleasant 37year old female who presents to the office for a GI consultation. Patient reports struggling with abdominal pain, bloating, constipation, and reflux over the past year. She had an EGD 7/27 through [facility] which was normal visually.   Biopsies were positive for h pylori and patient was treatment with Amoxicillin, Clarithromycin, and PPI. Follow up stool h pylori off the PPI was negative. She had a colonoscopy   which was normal and the terminal ileum was normal.She had a CT Scan with contrast and an ultrasound which showed gallstones. She is on Linzess 145mcg po daily but still having constipation, bloating and abdominal discomfort. She had insufficient relief with Miralax. She is also on Pantoprazole but still has episodes of RUQ pain after eating. No vomiting. No blood in the stool. She does report associated stress. REVIEW OF SYSTEMS:    CONSTITUTIONAL: Denies any fever, chills, rigors. HEENT: No earache or tinnitus. Denies hearing loss or visual disturbances. CARDIOVASCULAR: No chest pain or palpitations. RESPIRATORY: Denies any cough, hemoptysis, shortness of breath or dyspnea on exertion. GASTROINTESTINAL: As noted in the History of Present Illness. GENITOURINARY: No problems with urination. Denies any hematuria or dysuria. NEUROLOGIC: No dizziness or vertigo, denies headaches. MUSCULOSKELETAL: Denies any muscle or joint pain. SKIN: Denies skin rashes or itching. ENDOCRINE: Denies excessive thirst. Denies intolerance to heat or cold. PSYCHOSOCIAL: Denies depression or anxiety. Denies any recent memory loss. Historical Information   Past Medical History:   Diagnosis Date    GERD (gastroesophageal reflux disease)      Past Surgical History:   Procedure Laterality Date     SECTION       Social History   Social History     Substance and Sexual Activity   Alcohol Use Never     Social History     Substance and Sexual Activity   Drug Use Never     Social History     Tobacco Use   Smoking Status Never   Smokeless Tobacco Never     History reviewed. No pertinent family history.     Meds/Allergies       Current Outpatient Medications:     acyclovir (ZOVIRAX) 5 % ointment    cetirizine (ZyrTEC) 10 MG chewable tablet clotrimazole-betamethasone (LOTRISONE) 1-0.05 % cream    dicyclomine (BENTYL) 20 mg tablet    diphenhydrAMINE (BENADRYL) 25 mg capsule    Docusate Sodium (DSS) 100 MG CAPS    famotidine (PEPCID) 20 mg tablet    ibuprofen (Advil) 200 mg tablet    linaCLOtide (Linzess) 290 MCG CAPS    Magnesium 500 MG CAPS    magnesium oxide (MAG-OX) 400 mg tablet    naproxen sodium (ANAPROX) 550 mg tablet    norgestimate-ethinyl estradiol (ORTHO-CYCLEN) 0.25-35 MG-MCG per tablet    pantoprazole (PROTONIX) 40 mg tablet    polyethylene glycol (GLYCOLAX) 17 GM/SCOOP powder    Riboflavin 400 MG CAPS    simethicone (MYLICON) 80 mg chewable tablet    sucralfate (CARAFATE) 1 g tablet    valACYclovir (VALTREX) 1,000 mg tablet    Linzess 145 MCG CAPS    metoclopramide (REGLAN) 10 mg tablet    Allergies   Allergen Reactions    Dust Mite Extract Other (See Comments)    Flavoring Agent - Food Allergy Other (See Comments)     Pt reports several food reactions - tomato sauce and milk    Tuberculin Purified Protein Derivative Rash     Hx + ppd           Objective     Blood pressure 118/70, pulse 66, height 5' 10" (1.778 m), weight 83.9 kg (185 lb), SpO2 98 %. Body mass index is 26.54 kg/m². PHYSICAL EXAM:      General Appearance:   Alert, cooperative, no distress   HEENT:   Normocephalic, atraumatic, anicteric. Neck:  Supple, symmetrical, trachea midline   Lungs:   Clear to auscultation bilaterally; no rales, rhonchi or wheezing; respirations unlabored    Heart[de-identified]   Regular rate and rhythm; no murmur, rub, or gallop. Abdomen:   Soft, non-tender, non-distended; normal bowel sounds; no masses, no organomegaly    Genitalia:   Deferred    Rectal:   Deferred    Extremities:  No cyanosis, clubbing or edema    Pulses:  2+ and symmetric    Skin:  No jaundice, rashes, or lesions    Lymph nodes:  No palpable cervical lymphadenopathy        Lab Results:   No visits with results within 1 Day(s) from this visit.    Latest known visit with results is: Admission on 09/19/2023, Discharged on 09/19/2023   Component Date Value    WBC 09/19/2023 5.37     RBC 09/19/2023 3.85     Hemoglobin 09/19/2023 12.1     Hematocrit 09/19/2023 36.8     MCV 09/19/2023 96     MCH 09/19/2023 31.4     MCHC 09/19/2023 32.9     RDW 09/19/2023 12.3     MPV 09/19/2023 11.0     Platelets 50/11/4924 267     nRBC 09/19/2023 0     Neutrophils Relative 09/19/2023 48     Immat GRANS % 09/19/2023 0     Lymphocytes Relative 09/19/2023 43     Monocytes Relative 09/19/2023 5     Eosinophils Relative 09/19/2023 3     Basophils Relative 09/19/2023 1     Neutrophils Absolute 09/19/2023 2.53     Immature Grans Absolute 09/19/2023 0.01     Lymphocytes Absolute 09/19/2023 2.32     Monocytes Absolute 09/19/2023 0.27     Eosinophils Absolute 09/19/2023 0.18     Basophils Absolute 09/19/2023 0.06     Sodium 09/19/2023 141     Potassium 09/19/2023 3.6     Chloride 09/19/2023 108     CO2 09/19/2023 27     ANION GAP 09/19/2023 6     BUN 09/19/2023 13     Creatinine 09/19/2023 0.92     Glucose 09/19/2023 88     Calcium 09/19/2023 9.5     AST 09/19/2023 14     ALT 09/19/2023 10     Alkaline Phosphatase 09/19/2023 67     Total Protein 09/19/2023 7.6     Albumin 09/19/2023 4.3     Total Bilirubin 09/19/2023 0.26     eGFR 09/19/2023 76     Lipase 09/19/2023 58     Preg, Serum 09/19/2023 Negative          Radiology Results:   US abdomen complete    Result Date: 11/3/2023  Narrative: EXAM: EXAM: US ABDOMEN COMPLETE DATE  TIME: 11/2/2023 - 11/2/2023 10:07 am HISTORY: 36 y/o  F Generalized abdominal pains TECHNIQUE: Ultrasound of the abdomen was performed. COMPARISON: Abdominal radiograph 10/25/2023. FINDINGS: Liver: Normal size. Homogeneous echotexture. No focal hepatic lesion is identified. Bile ducts: No biliary ductal dilatation. The common bile duct measures 4 mm. Gallbladder: Cholelithiasis. No gallbladder wall thickening of pericholecystic fluid. Negative sonographic Torres's sign.  Pancreas: Visualized portions of the pancreas are unremarkable. Spleen: 8.3 cm. Normal size. No focal splenic lesion is identified. Right kidney: 10.4 cm. No shadowing stone, mass, or hydronephrosis. Left kidney: 9.0 cm. No shadowing stone, mass, or hydronephrosis. Aorta: Visualized abdominal aorta is normal in caliber. Upper IVC: Unremarkable. Ascites: None. Impression: IMPRESSION: Cholelithiasis without sonographic evidence of acute cholecystitis. XR abdomen 1 view kub    Result Date: 10/25/2023  Narrative: EXAM XR ABDOMEN 1 VIEW - 10/25/2023 11:36 am HISTORY R/o stool burden TECHNIQUE Single AP supine view of the abdomen was obtained. COMPARISON None. FINDINGS The bowel gas pattern is nonobstructive. No dilated loops of bowel are seen. Moderate fecal material in the colon. Phleboliths in the pelvis. Visualized osseous structures are unremarkable. Impression: IMPRESSION No evidence of obstruction.  Moderate colonic fecal material.

## 2023-11-22 ENCOUNTER — TELEPHONE (OUTPATIENT)
Age: 43
End: 2023-11-22

## 2023-11-22 NOTE — TELEPHONE ENCOUNTER
----- Message from Isaias Gusman PA-C sent at 11/22/2023 10:49 AM EST -----  Please inform patient that the blood work for celiac disease was negative and the TSH level (thyroid) was normal.

## 2023-12-04 ENCOUNTER — TELEPHONE (OUTPATIENT)
Dept: SURGERY | Facility: CLINIC | Age: 43
End: 2023-12-04

## 2023-12-04 NOTE — TELEPHONE ENCOUNTER
Patient called to set up appointment, please return call to patient Check here if all serologies below were negative, non-reactive or immune. Otherwise select appropriate status.

## 2024-11-22 ENCOUNTER — HOSPITAL ENCOUNTER (EMERGENCY)
Facility: HOSPITAL | Age: 44
Discharge: HOME | End: 2024-11-22
Attending: EMERGENCY MEDICINE
Payer: COMMERCIAL

## 2024-11-22 VITALS
WEIGHT: 191.8 LBS | RESPIRATION RATE: 22 BRPM | DIASTOLIC BLOOD PRESSURE: 64 MMHG | TEMPERATURE: 101.5 F | BODY MASS INDEX: 27.52 KG/M2 | OXYGEN SATURATION: 100 % | HEART RATE: 109 BPM | SYSTOLIC BLOOD PRESSURE: 127 MMHG

## 2024-11-22 DIAGNOSIS — J11.1 INFLUENZA: ICD-10-CM

## 2024-11-22 DIAGNOSIS — B34.9 VIRAL SYNDROME: Primary | ICD-10-CM

## 2024-11-22 LAB
FLUAV AG UPPER RESP QL IA.RAPID: POSITIVE
FLUBV AG UPPER RESP QL IA.RAPID: NEGATIVE
SARS-COV+SARS-COV-2 AG RESP QL IA.RAPID: NEGATIVE

## 2024-11-22 PROCEDURE — 99284 EMERGENCY DEPT VISIT MOD MDM: CPT

## 2024-11-22 PROCEDURE — 99283 EMERGENCY DEPT VISIT LOW MDM: CPT

## 2024-11-22 PROCEDURE — 87811 SARS-COV-2 COVID19 W/OPTIC: CPT

## 2024-11-22 PROCEDURE — 87804 INFLUENZA ASSAY W/OPTIC: CPT

## 2024-11-22 RX ORDER — ALBUTEROL SULFATE 90 UG/1
2 INHALANT RESPIRATORY (INHALATION) ONCE
Status: COMPLETED | OUTPATIENT
Start: 2024-11-22 | End: 2024-11-22

## 2024-11-22 RX ORDER — FLUTICASONE PROPIONATE 50 MCG
1 SPRAY, SUSPENSION (ML) NASAL DAILY
Status: DISCONTINUED | OUTPATIENT
Start: 2024-11-22 | End: 2024-11-22 | Stop reason: HOSPADM

## 2024-11-22 RX ORDER — ACETAMINOPHEN 325 MG/1
975 TABLET ORAL ONCE
Status: COMPLETED | OUTPATIENT
Start: 2024-11-22 | End: 2024-11-22

## 2024-11-22 RX ADMIN — FLUTICASONE PROPIONATE 1 SPRAY: 50 SPRAY, METERED NASAL at 08:07

## 2024-11-22 RX ADMIN — DEXAMETHASONE SODIUM PHOSPHATE 10 MG: 10 INJECTION, SOLUTION INTRAMUSCULAR; INTRAVENOUS at 08:07

## 2024-11-22 RX ADMIN — ACETAMINOPHEN 975 MG: 325 TABLET, FILM COATED ORAL at 08:07

## 2024-11-22 RX ADMIN — ALBUTEROL SULFATE 2 PUFF: 90 AEROSOL, METERED RESPIRATORY (INHALATION) at 08:08

## 2024-11-22 NOTE — Clinical Note
Shamar Hanson was seen and treated in our emergency department on 11/22/2024.                Diagnosis:     Shamar  may return to work on return date.    She may return on this date: 11/25/2024         If you have any questions or concerns, please don't hesitate to call.      TALIA Worthy    ______________________________           _______________          _______________  Hospital Representative                              Date                                Time

## 2024-11-22 NOTE — ED PROVIDER NOTES
Time reflects when diagnosis was documented in both MDM as applicable and the Disposition within this note       Time User Action Codes Description Comment    11/22/2024  7:58 AM Carmen Han Add [B34.9] Viral syndrome     11/22/2024  8:47 AM Carmen Han Add [J11.1] Influenza           ED Disposition       ED Disposition   Discharge    Condition   Stable    Date/Time   Fri Nov 22, 2024  7:58 AM    Comment   Shamar Martelanan discharge to home/self care.                   Assessment & Plan       Medical Decision Making  This patient presents with symptoms suspicious for likely viral upper respiratory infection.  Based on history and physical doubt sinusitis.  COVID/flu/RSV was sent and resulted: Positive Flu. Do not suspect underlying cardio or pulmonary process.  I considered, I think but unlikely dangerous causes of patient's symptoms to include ACS, CHF, COPD/asthma exacerbation, pneumonia or pneumothorax.  Patient is nontoxic-appearing and not in need of emergent intervention at this time.  Patient told to self isolate at home until symptoms subside.  Advised to follow-up with primary care provider.  Return to the ER symptoms worsens or questions or concerns arise at home.      Amount and/or Complexity of Data Reviewed  Labs: ordered. Decision-making details documented in ED Course.    Risk  OTC drugs.  Prescription drug management.        ED Course as of 11/22/24 0847   Fri Nov 22, 2024   0846 FLU/COVID Rapid Antigen (30 min. TAT) - Preferred screening test in ED(!)  Noted flu       Medications   fluticasone (FLONASE) 50 mcg/act nasal spray 1 spray (1 spray Each Nare Given 11/22/24 0807)   acetaminophen (TYLENOL) tablet 975 mg (975 mg Oral Given 11/22/24 0807)   dexamethasone oral liquid 10 mg 1 mL (10 mg Oral Given 11/22/24 0807)   albuterol (PROVENTIL HFA,VENTOLIN HFA) inhaler 2 puff (2 puffs Inhalation Given 11/22/24 0808)       ED Risk Strat Scores                           SBIRT 22yo+      Flowsheet Row  Most Recent Value   Initial Alcohol Screen: US AUDIT-C     1. How often do you have a drink containing alcohol? 0 Filed at: 2024   2. How many drinks containing alcohol do you have on a typical day you are drinking?  0 Filed at: 2024   3b. FEMALE Any Age, or MALE 65+: How often do you have 4 or more drinks on one occassion? 0 Filed at: 2024   Audit-C Score 0 Filed at: 2024   JORGE: How many times in the past year have you...    Used an illegal drug or used a prescription medication for non-medical reasons? Never Filed at: 2024                            History of Present Illness       Chief Complaint   Patient presents with    Flu Symptoms     Patient co cough, congestion, body aches, fever that started a few days ago.        Past Medical History:   Diagnosis Date    GERD (gastroesophageal reflux disease)       Past Surgical History:   Procedure Laterality Date     SECTION      FL LUMBAR PUNCTURE DIAGNOSTIC  2014      History reviewed. No pertinent family history.   Social History     Tobacco Use    Smoking status: Never    Smokeless tobacco: Never   Vaping Use    Vaping status: Never Used   Substance Use Topics    Alcohol use: Never    Drug use: Never      E-Cigarette/Vaping    E-Cigarette Use Never User       E-Cigarette/Vaping Substances    Nicotine No     THC No     CBD No     Flavoring No     Other No     Unknown No       I have reviewed and agree with the history as documented.     43 y/o female patient presents to the ER for evaluation of URI symptoms. Patient stated that she has been having URI symptoms for the last three days. Patient reports that she has been having fever, chills, nausea, body aches, congestion, sore throat. She reports that she has been taking Tylenol without relief. Last dose was last night before work. She is talking in full complete sentences without distress, tripoding, or drooling.     No noted vomiting,  diarrhea, or abdominal pain.  No noted chest pain, shortness of breath, syncope, dizziness  No noted flank pain, hematuria, UTI symptoms  No recent travel outside of the country, suspicious food intake  Noted sick contacts        Review of Systems   Constitutional:  Positive for chills, fatigue and fever.   HENT:  Positive for congestion, ear pain, sinus pressure and sore throat.    Eyes:  Negative for pain and visual disturbance.   Respiratory:  Positive for cough and chest tightness. Negative for shortness of breath.    Cardiovascular:  Negative for chest pain and palpitations.   Gastrointestinal:  Negative for abdominal pain and vomiting.   Genitourinary:  Negative for dysuria and hematuria.   Musculoskeletal:  Negative for arthralgias and back pain.   Skin:  Negative for color change and rash.   Neurological:  Negative for seizures and syncope.   All other systems reviewed and are negative.          Objective       ED Triage Vitals   Temperature Pulse Blood Pressure Respirations SpO2 Patient Position - Orthostatic VS   11/22/24 0746 11/22/24 0746 11/22/24 0746 11/22/24 0746 11/22/24 0746 11/22/24 0746   (!) 101.5 °F (38.6 °C) (!) 109 127/64 22 100 % Sitting      Temp Source Heart Rate Source BP Location FiO2 (%) Pain Score    11/22/24 0746 11/22/24 0746 11/22/24 0746 -- 11/22/24 0807    Oral Monitor Left arm  7      Vitals      Date and Time Temp Pulse SpO2 Resp BP Pain Score FACES Pain Rating User   11/22/24 0807 -- -- -- -- -- 7 -- KM   11/22/24 0746 101.5 °F (38.6 °C) 109 100 % 22 127/64 -- -- GP            Physical Exam  Vitals and nursing note reviewed.   Constitutional:       General: She is not in acute distress.     Appearance: She is well-developed.   HENT:      Head: Normocephalic and atraumatic.      Right Ear: A middle ear effusion is present.      Left Ear: A middle ear effusion is present.   Eyes:      Conjunctiva/sclera: Conjunctivae normal.   Cardiovascular:      Rate and Rhythm: Normal rate and  regular rhythm.      Pulses: Normal pulses.      Heart sounds: Normal heart sounds.   Pulmonary:      Effort: Pulmonary effort is normal. No respiratory distress.      Breath sounds: Normal breath sounds.   Abdominal:      Palpations: Abdomen is soft.   Musculoskeletal:         General: No swelling.      Cervical back: Neck supple.   Skin:     General: Skin is warm and dry.      Capillary Refill: Capillary refill takes less than 2 seconds.   Neurological:      Mental Status: She is alert and oriented to person, place, and time.   Psychiatric:         Mood and Affect: Mood normal.         Behavior: Behavior normal.         Results Reviewed       Procedure Component Value Units Date/Time    FLU/COVID Rapid Antigen (30 min. TAT) - Preferred screening test in ED [[id number]]  (Abnormal) Collected: 11/22/24 0806    Lab Status: Final result Specimen: Nares from Nose Updated: 11/22/24 0832     SARS COV Rapid Antigen Negative     Influenza A Rapid Antigen Positive     Influenza B Rapid Antigen Negative    Narrative:      This test has been performed using the [employer] [facility] 2 FLU+SARS Antigen test under the Emergency Use Authorization (EUA). This test has been validated by the  and verified by the performing laboratory. The [facility] uses lateral flow immunofluorescent sandwich assay to detect SARS-COV, Influenza A and Influenza B Antigen.     The [employer] [facility] 2 SARS Antigen test does not differentiate between SARS-CoV and SARS-CoV-2.     Negative results are presumptive and may be confirmed with a molecular assay, if necessary, for patient management. Negative results do not rule out SARS-CoV-2 or influenza infection and should not be used as the sole basis for treatment or patient management decisions. A negative test result may occur if the level of antigen in a sample is below the limit of detection of this test.     Positive results are indicative of the presence of viral antigens, but do not rule out bacterial  infection or co-infection with other viruses.     All test results should be used as an adjunct to clinical observations and other information available to the provider.    FOR PEDIATRIC PATIENTS - copy/paste COVID Guidelines URL to browser: [email or web address]            No orders to display       Procedures    ED Medication and Procedure Management   Prior to Admission Medications   Prescriptions Last Dose Informant Patient Reported? Taking?   Docusate Sodium (DSS) 100 MG CAPS  Self Yes No   Sig: Take 100 mg by mouth 2 (two) times a day   Linzess 145 MCG CAPS  Self Yes No   Sig: Take 145 mcg by mouth daily before breakfast   Patient not taking: Reported on 11/10/2023   Magnesium 500 MG CAPS  Self Yes No   Sig: Take 500 mg by mouth daily   Riboflavin 400 MG CAPS  Self Yes No   Sig: Take 400 mg by mouth daily   acyclovir (ZOVIRAX) 5 % ointment  Self Yes No   Sig: Apply topically   cetirizine (ZyrTEC) 10 MG chewable tablet  Self Yes No   Sig: Chew 10 mg daily   clotrimazole-betamethasone (LOTRISONE) 1-0.05 % cream  Self Yes No   Sig: Apply topically   dicyclomine (BENTYL) 20 mg tablet   No No   Sig: Take 1 tablet (20 mg total) by mouth every 8 (eight) hours as needed (abdominal pain)   diphenhydrAMINE (BENADRYL) 25 mg capsule  Self Yes No   Sig: Take 25 mg by mouth   famotidine (PEPCID) 20 mg tablet  Self Yes No   Sig: take 1 tablet by mouth TWICE DAILY (MORNING AND BEFORE BEDTIME)   ibuprofen (Advil) 200 mg tablet  Self Yes No   Sig: Take by mouth   linaCLOtide (Linzess) 290 MCG CAPS   No No   Sig: Take 1 capsule by mouth in the morning   magnesium oxide (MAG-OX) 400 mg tablet  Self Yes No   Sig: Take 1 tablet by mouth daily   metoclopramide (REGLAN) 10 mg tablet  Self Yes No   Sig: Take 10 mg by mouth   Patient not taking: Reported on 11/10/2023   naproxen sodium (ANAPROX) 550 mg tablet  Self Yes No   Sig: Take 550 mg by mouth   norgestimate-ethinyl estradiol  (ORTHO-CYCLEN) 0.25-35 MG-MCG per tablet  Self Yes No   Sig: Take 1 tablet by mouth daily   pantoprazole (PROTONIX) 40 mg tablet  Self Yes No   Sig: Take 40 mg by mouth 2 (two) times a day with meals With morning and evening   polyethylene glycol (GLYCOLAX) 17 GM/SCOOP powder  Self Yes No   Sig: Take 17 g by mouth daily   simethicone (MYLICON) 80 mg chewable tablet  Self Yes No   Sig: Chew 80 mg   sucralfate (CARAFATE) 1 g tablet  Self Yes No   Sig: Take 1 g by mouth 4 (four) times a day   valACYclovir (VALTREX) 1,000 mg tablet  Self Yes No   Sig: Take 1,000 mg by mouth daily      Facility-Administered Medications: None     Patient's Medications   Discharge Prescriptions    No medications on file     No discharge procedures on file.  ED SEPSIS DOCUMENTATION   Time reflects when diagnosis was documented in both MDM as applicable and the Disposition within this note       Time User Action Codes Description Comment    11/22/2024  7:58 AM Carmen Han [B34.9] Viral syndrome     11/22/2024  8:47 AM Carmen Han [J11.1] Influenza                  TALIA Worthy  11/22/24 0847